# Patient Record
(demographics unavailable — no encounter records)

---

## 2024-12-13 NOTE — DISCUSSION/SUMMARY
[de-identified] :  WC DOI: 3/20/24  The patient presents for [facility]. It is within degree of medical certainty that the patient has reached maximum medical improvement without surgical intervention. Continue advancing activity as tolerated. Follow up on a PRN basis unless new symptoms arise.    I, Ana Amaya, attest that this documentation has been prepared under the direction and in the presence of Provider Dr. Jimmy Villatoro

## 2024-12-13 NOTE — HISTORY OF PRESENT ILLNESS
[de-identified] : Here for [facility] of the right elbow from  DOI 3/20/24. Experiencing some limited strength

## 2024-12-13 NOTE — WORK
[Right] : right [FreeTextEntry7] : elbow [FreeTextEntry8] : , supination: 30, pronation: 90 [FreeTextEntry5] : 15 [de-identified] :  Measurements made in office today were performed with a goniometer and repeated 3 times for accuracy.

## 2024-12-13 NOTE — HISTORY OF PRESENT ILLNESS
[de-identified] : Here for [facility] of the right elbow from  DOI 3/20/24. Experiencing some limited strength

## 2024-12-13 NOTE — WORK
[Right] : right [FreeTextEntry7] : elbow [FreeTextEntry8] : , supination: 30, pronation: 90 [FreeTextEntry5] : 15 [de-identified] :  Measurements made in office today were performed with a goniometer and repeated 3 times for accuracy.

## 2024-12-13 NOTE — DISCUSSION/SUMMARY
[de-identified] :  WC DOI: 3/20/24  The patient presents for [facility]. It is within degree of medical certainty that the patient has reached maximum medical improvement without surgical intervention. Continue advancing activity as tolerated. Follow up on a PRN basis unless new symptoms arise.    I, Ana Amaya, attest that this documentation has been prepared under the direction and in the presence of Provider Dr. Jimmy Villatoro

## 2025-05-28 NOTE — PHYSICAL EXAM
[Outer Ear] : the ears and nose were normal in appearance [] : no respiratory distress [Respiration, Rhythm And Depth] : normal respiratory rhythm and effort [Exaggerated Use Of Accessory Muscles For Inspiration] : no accessory muscle use [Heart Rate And Rhythm] : heart rate and rhythm were normal [Range of Motion to Joints] : range of motion to joints [Normal] : oriented to person, place and time, the affect was normal, the mood was normal and not anxious

## 2025-05-28 NOTE — VITALS
[Maximal Pain Intensity: 0/10] : 0/10 [100: Normal, no complaints, no evidence of disease.] : 100: Normal, no complaints, no evidence of disease. [3 - Distress Level] : Distress Level: 3

## 2025-05-28 NOTE — DISEASE MANAGEMENT
[Clinical] : TNM Stage: c [I] : I [FreeTextEntry4] : nonmelanomatous skin cancer [TTNM] : 1 [NTNM] : 0 [MTNM] : 0

## 2025-05-28 NOTE — REVIEW OF SYSTEMS
[Negative] : Heme/Lymph [Fatigue: Grade 0] : Fatigue: Grade 0 [Blurred Vision: Grade 0] : Blurred Vision: Grade 0 [Dizziness: Grade 0] : Dizziness: Grade 0  [Headache: Grade 0] : Headache: Grade 0 [Lethargy: Grade 0] : Lethargy: Grade 0 [Anxiety: Grade 0] : Anxiety: Grade 0  [Depression: Grade 0] : Depression: Grade 0 [Alopecia: Grade 0] : Alopecia: Grade 0 [Pruritus: Grade 0] : Pruritus: Grade 0 [Skin Atrophy: Grade 0] : Skin Atrophy: Grade 0 [Skin Hyperpigmentation: Grade 0] : Skin Hyperpigmentation: Grade 0 [Skin Induration: Grade 0] : Skin Induration: Grade 0 [Dermatitis Radiation: Grade 0] : Dermatitis Radiation: Grade 0

## 2025-05-28 NOTE — HISTORY OF PRESENT ILLNESS
[FreeTextEntry1] : 53 year old gentleman presents today for consultation regarding radiation therapy for skin carcinoma.  He has a PMH of asthma and HLD.  He was seen by Dr. Jimmy West.  4/22/25 biopsy of a left preauricular lesion showed basal cell carcinoma, and biopsy of the left lower forehead above eyebrow showed squamous cell carcinoma, in situ.  He met with Dr. Jose Altman on 5/1/25 to discuss Mohs.  5/30/25 Patient presents for consultation along with his Wife. Patient is feeling generally well with no complaints of discomfort or pain.   Care team: Abiola West

## 2025-06-03 NOTE — HISTORY OF PRESENT ILLNESS
[FreeTextEntry1] : 53 year old gentleman returns today for further discussion regarding radiation therapy and radiation planning for skin carcinoma.  Recall, he has a PMH of asthma and HLD.  He was seen by Dr. Jimmy West.  4/22/25 biopsy of a left preauricular lesion showed basal cell carcinoma, and biopsy of the left lower forehead above eyebrow showed squamous cell carcinoma, in situ.  He met with Dr. Jose Altman on 5/1/25 to discuss Mohs.  pruritus, edema, pain, fatigue or weight loss  Care team: Abiola West

## 2025-06-03 NOTE — DISEASE MANAGEMENT
[Clinical] : TNM Stage: c [FreeTextEntry4] : nonmelanomatous skin cancer [TTNM] : 1 [NTNM] : 0 [MTNM] : 0 [I] : I

## 2025-06-10 NOTE — DISEASE MANAGEMENT
[FreeTextEntry4] : nonmelanomatous skin cancer [TTNM] : 1 [NTNM] : 0 [MTNM] : 0 [de-identified] : 7444 [de-identified] : left forehead/left preauricular area

## 2025-06-20 NOTE — PHYSICAL EXAM
[Normal] : oriented to person, place and time, the affect was normal, the mood was normal and not anxious [de-identified] : mild erythema

## 2025-06-20 NOTE — DISEASE MANAGEMENT
[FreeTextEntry4] : nonmelanomatous skin cancer [TTNM] : 1 [NTNM] : 0 [MTNM] : 0 [de-identified] : 8433 [de-identified] : 7153 [de-identified] : left forehead/left preauricular area

## 2025-06-20 NOTE — REVIEW OF SYSTEMS
[Edema Limbs: Grade 0] : Edema Limbs: Grade 0  [Fatigue: Grade 0] : Fatigue: Grade 0 [Localized Edema: Grade 0] : Localized Edema: Grade 0  [Neck Edema: Grade 0] : Neck Edema: Grade 0 [Skin Hyperpigmentation: Grade 0] : Skin Hyperpigmentation: Grade 0 [Dermatitis Radiation: Grade 0] : Dermatitis Radiation: Grade 0

## 2025-07-10 NOTE — PHYSICAL EXAM
[Normal] : oriented to person, place and time, the affect was normal, the mood was normal and not anxious [de-identified] : brisk erythema; dry desquamation

## 2025-07-10 NOTE — DISEASE MANAGEMENT
[Clinical] : TNM Stage: c [I] : I [FreeTextEntry4] : nonmelanomatous skin cancer [TTNM] : 1 [NTNM] : 0 [MTNM] : 0 [de-identified] : 4170 [de-identified] : 7604 [de-identified] : left forehead/left preauricular area

## 2025-07-10 NOTE — REVIEW OF SYSTEMS
[Edema Limbs: Grade 0] : Edema Limbs: Grade 0  [Fatigue: Grade 0] : Fatigue: Grade 0 [Localized Edema: Grade 0] : Localized Edema: Grade 0  [Neck Edema: Grade 0] : Neck Edema: Grade 0 [Skin Hyperpigmentation: Grade 1 - Hyperpigmentation covering <10% BSA; no psychosocial impact] : Skin Hyperpigmentation: Grade 1 - Hyperpigmentation covering <10% BSA; no psychosocial impact [Dermatitis Radiation: Grade 1 - Faint erythema or dry desquamation] : Dermatitis Radiation: Grade 1 - Faint erythema or dry desquamation